# Patient Record
Sex: FEMALE | Race: WHITE | URBAN - METROPOLITAN AREA
[De-identification: names, ages, dates, MRNs, and addresses within clinical notes are randomized per-mention and may not be internally consistent; named-entity substitution may affect disease eponyms.]

---

## 2022-06-21 ENCOUNTER — TRANSFERRED RECORDS (OUTPATIENT)
Dept: HEALTH INFORMATION MANAGEMENT | Facility: CLINIC | Age: 62
End: 2022-06-21

## 2022-07-05 ENCOUNTER — TRANSFERRED RECORDS (OUTPATIENT)
Dept: HEALTH INFORMATION MANAGEMENT | Facility: CLINIC | Age: 62
End: 2022-07-05

## 2022-07-05 ENCOUNTER — MEDICAL CORRESPONDENCE (OUTPATIENT)
Dept: HEALTH INFORMATION MANAGEMENT | Facility: CLINIC | Age: 62
End: 2022-07-05

## 2022-07-11 ENCOUNTER — TRANSCRIBE ORDERS (OUTPATIENT)
Dept: OTHER | Age: 62
End: 2022-07-11

## 2022-07-11 DIAGNOSIS — H00.12 CHALAZION RIGHT LOWER EYELID: Primary | ICD-10-CM

## 2022-08-03 ENCOUNTER — OFFICE VISIT (OUTPATIENT)
Dept: OPHTHALMOLOGY | Facility: CLINIC | Age: 62
End: 2022-08-03
Payer: COMMERCIAL

## 2022-08-03 ENCOUNTER — TELEPHONE (OUTPATIENT)
Dept: OPHTHALMOLOGY | Facility: CLINIC | Age: 62
End: 2022-08-03

## 2022-08-03 DIAGNOSIS — C44.1122 BASAL CELL CARCINOMA (BCC) OF RIGHT LOWER EYELID: Primary | ICD-10-CM

## 2022-08-03 PROCEDURE — 92285 EXTERNAL OCULAR PHOTOGRAPHY: CPT | Mod: GC | Performed by: OPHTHALMOLOGY

## 2022-08-03 PROCEDURE — 99204 OFFICE O/P NEW MOD 45 MIN: CPT | Mod: GC | Performed by: OPHTHALMOLOGY

## 2022-08-03 RX ORDER — CHLORAL HYDRATE 500 MG
1 CAPSULE ORAL DAILY
COMMUNITY

## 2022-08-03 RX ORDER — LEVOTHYROXINE SODIUM 50 UG/1
50 TABLET ORAL
COMMUNITY
Start: 2022-08-03

## 2022-08-03 RX ORDER — SERTRALINE HYDROCHLORIDE 100 MG/1
100 TABLET, FILM COATED ORAL DAILY
COMMUNITY
Start: 2022-01-26

## 2022-08-03 RX ORDER — SIMVASTATIN 40 MG
20 TABLET ORAL DAILY
COMMUNITY
Start: 2022-01-26

## 2022-08-03 ASSESSMENT — VISUAL ACUITY
METHOD: SNELLEN - LINEAR
CORRECTION_TYPE: GLASSES
OS_CC: 20/15
OS_CC+: -1
OD_CC: 20/20

## 2022-08-03 ASSESSMENT — TONOMETRY
OD_IOP_MMHG: 16
OS_IOP_MMHG: 14
IOP_METHOD: ICARE

## 2022-08-03 ASSESSMENT — CONF VISUAL FIELD
METHOD: COUNTING FINGERS
OD_NORMAL: 1
OS_NORMAL: 1

## 2022-08-03 ASSESSMENT — EXTERNAL EXAM - LEFT EYE: OS_EXAM: NORMAL

## 2022-08-03 ASSESSMENT — EXTERNAL EXAM - RIGHT EYE: OD_EXAM: NORMAL

## 2022-08-03 NOTE — LETTER
8/3/2022         RE:  :  MRN: Shante Huggins  1960  [id number]     Dear Dr. Daly,    Thank you for asking me to see your patient, Shante Huggins, for an oculoplastic   consultation.  My assessment and plan are below.  For further details, please see my attached clinic note.      Chief Complaint(s) and History of Present Illness(es)     Lesion On Right Lower Lid     Laterality: right lower lid              Comments     Patient referred by Dr. Daly for lesion, right lower eyelid. Biopsy   done by Dr. Daly on 22, came back positive for basal cell   carcinoma. Pt presents today to discuss next steps. No concerns with how   the area has healed after the biopsy.     Pt has 1 year history of RLL lesion that was progressively enlarging, and she saw her ophthalmologist Dr. Daly who biopsied the lesion and the report shows BCC. She also recently had BCC removed from the left forehead (hairline) region s/p mohs and reconstruction. Pt was referred here for further eval. She denies prior malignancies.      Assessment & Plan     Shante Huggins is a 62 year old female with the following diagnoses:    Diagnosis Comments   1. Basal cell carcinoma (BCC) of right lower eyelid       Right lower eyelid lesion  - Path: basal cell carcinoma extending to radial tissue edge  - Will arrange for mohs followed by reconstruction  - Plan: Right lower eyelid Mohs and reconstruction - likely Josh    Patient disposition:   No follow-ups on file.         Again, thank you for allowing me to participate in the care of your patient.      Sincerely,    Shirley Kimball MD  Department of Ophthalmology and Visual Neurosciences  [facility]    CC: VINNY DALY  [facility]  [address] [address]  Via Fax: [phone]

## 2022-08-03 NOTE — NURSING NOTE
Chief Complaints and History of Present Illnesses   Patient presents with     Lesion On Right Lower Lid       Chief Complaint(s) and History of Present Illness(es)     Lesion On Right Lower Lid     Laterality: right lower lid              Comments     Patient referred by Dr. Daly for lesion, right lower eyelid. Biopsy done by Dr. Daly on 6/21/22, came back positive for basal cell carcinoma. Pt presents today to discuss next steps. No concerns with how the area has healed after the biopsy.                     Khadijah Padron, COA

## 2022-08-03 NOTE — TELEPHONE ENCOUNTER
Message left for the patient to call back to get surgery scheduled with Dr. Watson on either 9-19 or 9-26 following Mohs with Dr. Johns.  Alexus Prince, Surgery Scheduling Coordinator

## 2022-08-03 NOTE — PROGRESS NOTES
Chief Complaint(s) and History of Present Illness(es)     Lesion On Right Lower Lid     Laterality: right lower lid              Comments     Patient referred by Dr. Daly for lesion, right lower eyelid. Biopsy   done by Dr. Daly on 6/21/22, came back positive for basal cell   carcinoma. Pt presents today to discuss next steps. No concerns with how   the area has healed after the biopsy.     Pt has 1 year history of RLL lesion that was progressively enlarging, and she saw her ophthalmologist Dr. Daly who biopsied the lesion and the report shows BCC. She also recently had BCC removed from the left forehead (hairline) region s/p mohs and reconstruction. Pt was referred here for further eval. She denies prior malignancies.      Assessment & Plan     Shante Huggins is a 62 year old female with the following diagnoses:    Diagnosis Comments   1. Basal cell carcinoma (BCC) of right lower eyelid       Right lower eyelid lesion  - Path: basal cell carcinoma extending to radial tissue edge  - Will arrange for mohs followed by reconstruction  - Plan: Right lower eyelid Mohs and reconstruction - likely Tenzel    Patient disposition:   No follow-ups on file.       Lola Asencio MD  Oculoplastic Surgery Fellow    Attending Physician Attestation: Complete documentation of historical and exam elements from today's encounter can be found in the full encounter summary report (not reduplicated in this progress note). I personally obtained the chief complaint(s) and history of present illness. I confirmed and edited as necessary the review of systems, past medical/surgical history, family history, social history, and examination findings as documented by others; and I examined the patient myself. I personally reviewed the relevant tests, images, and reports as documented above. I formulated and edited as necessary the assessment and plan and discussed the findings and management plan with the patient.  -Shirley Kimball,  MD      Today with Shante Huggins, I reviewed the indications, risks, benefits, and alternatives of the proposed surgical procedure including, but not limited to, failure obtain the desired result  and need for additional surgery, bleeding, infection,scarring, need for flaps that require the eyelid to be sewn closed, and the remote possibility of permanent damage to any organ system with the use of anesthesia.  I provided multiple opportunities for the questions, answered all questions to the best of my ability, and confirmed that my answers and my discussion were understood.   Shirley Kimball MD

## 2022-08-08 PROBLEM — C44.1122 BASAL CELL CARCINOMA (BCC) OF RIGHT LOWER EYELID: Status: ACTIVE | Noted: 2022-08-08

## 2022-08-08 NOTE — TELEPHONE ENCOUNTER
Date Scheduled: 9-19-22  Facility: [facility]  Surgeon: Dr. Kimball   Post-op appointment scheduled:    scheduled?: No  Surgery packet/instructions confirmed received?  Yes-mailed  Special Considerations:   Alexus Prince, Surgery Scheduling Coordinator

## 2022-08-09 ENCOUNTER — TELEPHONE (OUTPATIENT)
Dept: DERMATOLOGY | Facility: CLINIC | Age: 62
End: 2022-08-09

## 2022-08-09 NOTE — LETTER
"      Ms.Jodi Huggins  [address] [place] [address]        You have an upcoming appointment with Dr. Johns for Mohs surgery.  It is scheduled for 09/19/2022 at 7:30 AM.  Please check-in 15 minutes prior to your appointment at check-in desk \"D\" on the 2nd floor. Our address is [address] [place] [address].  Please review these important reminders:    1) Expect to be here through the lunch hour.  The Mohs surgical procedure can be an extensive surgery requiring multiple stages.  Each stage may take 1-2 hours.     2) You will need a  to be with you if your surgical site is close to your eyes. You can get swelling/bruising immediately after surgery and will go home with a big bulky bandage that could obstruct your view of driving safely.     3) Wear comfortable clothing -- preferably a button down shirt or a loose fitted shirt (to avoid pulling over pressure bandage off when you get home). If your surgery site is on your leg, try wearing loose fitted pants. If your surgery site is on your arm, try wearing a short-sleeve shirt.     4) Bring reading material or other items to help pass time. We do have internet access available if you own a laptop, iPad, etc.    7) If your surgery is on the face, please do not wear make-up.  8) You will not be able to get the wound wet for 48 hrs.  Please shower the morning of your procedure.  If the procedure is on your scalp or along your hair line, please wash your hair the morning of the procedure and avoid any hair products.    9)If you need to reschedule or have any questions or concerns, please call me at [phone].    Sincerely,  Diane Mcdowell RN  General surgery, Plastics and Dermatology   [facility]            "

## 2022-08-09 NOTE — TELEPHONE ENCOUNTER
[facility] [place] Pre-Surgery Screening Note     Pre-screening Information:  Hx of Skin Cancer: Yes  Hx of Mohs Surgery: Yes  Transplant: No  Immunocompromised: No  Current Anticoagulant(s): None  Bleeding Disorder(s): No  Stent: No  Pacemaker: No  Defibrillator: No  Brain/Nerve Stimulator: No  Endocarditis/Rheumatic Fever Hx: No  Vascular graft: No  Prophylactic Antibiotic Needed: No  Congenital heart defect: No  Prosthetic Heart Valve: No  Lesion on Leg/Groin: No  Prosthetic Joint : No  Diabetic: No  HIV/AIDS: No  Hepatitis: No  Pregnant: No  Prior Problem with Local Anesthesia: No  Patient wears CPAP mask: No  Currently Hypertensive: No  Photoprotection: for extended outdoor activities only  Sunburns: frequently  Tanning Bed Use: never  Current Tobacco Use: No  Current Alcohol Use: Yes (once per month)  Extended Care Facility: No  Occupation:  (worked with [employer])  Referring MD:  in [facility]   needed?: Yes  Do you have mobility issues?: No  Do you use any assistive devices/DME?: No  Do you have any issues with lying for long periods of time?: No      Medications/Allergies  Medications and allergies review with patient: Yes     Current Outpatient Medications   Medication Sig Dispense Refill     betamethasone valerate (VALISONE) 0.1 % external ointment Apply 1 Application topically as needed       fish oil-omega-3 fatty acids 1000 MG capsule Take 1 capsule by mouth daily       levothyroxine (SYNTHROID/LEVOTHROID) 50 MCG tablet Take 50 mcg by mouth daily (with breakfast)       sertraline (ZOLOFT) 100 MG tablet Take 100 mg by mouth daily       simvastatin (ZOCOR) 40 MG tablet Take 20 mg by mouth daily 0.5 tablet daily       Allergies   Allergen Reactions     Animal Dander Unknown     Dogs, cats, dust, etc.       Pertinent Labs:  Last INR: No results found for: INR    Other Reminders:    Reminded patient to take any order prophylactic  antibiotics 1 hour prior to appointment: N/A     Please be aware that this can be an all day procedure. Please bring your daily medications and food/cash. Encourage patient to eat prior to procedure(s). After care instructions were reviewed with patient.    If any positives, send to RN to initiate antibiotic prophylaxis protocol and/or anticoagulation management protocol      Diane Mcdowell RN

## 2022-08-09 NOTE — TELEPHONE ENCOUNTER
Left message for patient to call [facility] back at [phone]      Patient is scheduled for MOHS with Dr. Johns on 9/19/2022 at 7:30am and closure with Dr. Watson same day in OR. Please schedule for telephone consult with Dr. Johns prior to MOHS appointment and complete flow sheet for MOHS.     Arlette Ji LPN

## 2022-08-29 ENCOUNTER — VIRTUAL VISIT (OUTPATIENT)
Dept: DERMATOLOGY | Facility: CLINIC | Age: 62
End: 2022-08-29
Payer: COMMERCIAL

## 2022-08-29 DIAGNOSIS — C44.1122 BASAL CELL CARCINOMA OF RIGHT LOWER EYELID: Primary | ICD-10-CM

## 2022-08-29 PROCEDURE — 99213 OFFICE O/P EST LOW 20 MIN: CPT | Mod: TEL | Performed by: DERMATOLOGY

## 2022-08-29 NOTE — PROGRESS NOTES
[facility] Dermatology Note  Encounter Date: Aug 29, 2022  Store-and-Forward and Telephone ([phone]). Location of teledermatologist: [facility].  Start time: 1645. End time: 1650.      Dermatology Problem List:  1. BCC - right lower eyelid s/p bx 8/29/22; Mohs and Oculoplastic repair scheduled 9/19/22    ____________________________________________    Assessment & Plan:    # BCC - right lower eyelid s/p bx 8/29/22    Reviewed pathology report and nature of diagnosis.     Risks, benefits, and process of Mohs micrographic surgery were discussed including possibility of damage to surrounding anatomical structures and infection Mohs scheduled 9/19/22    No indication for pre-op antibiotics.    Pre-op written instructions provided.     Procedures Performed:   None    Follow-up: Sept 19 for Mohs.     Staff and Scribe:     Scribe Disclosure:   IRaymond, am serving as a scribe to document services personally performed by this physician, Dr. Dion Johns, based on data collection and the provider's statements to me.     Provider Disclosure:   The documentation recorded by the scribe accurately reflects the services I personally performed and the decisions made by me.    Dion Johns DO    Department of Dermatology  [facility]: Phone: [phone], Fax:[phone]  [facility]: Phone: [phone], Fax: [phone]    ____________________________________________    CC: Consult For (Mohs consult - BCC right lower eyelid.  Dr. Watson to complete repair.)    HPI:  Ms. Shante Huggins is a(n) 62 year old female who presents today as a return patient for a consult.     Last seen on 5/4/22 with Dr. Ang for [facility]. She has had BCC and Mohs on her forehead before.     Patient is otherwise feeling well, without additional skin concerns.    Labs Reviewed:    Dermpath report reviewed. BCC - right lower eyelid s/p bx 8/29/22    Physical Exam:  Vitals: There were no vitals taken for this visit.  SKIN: Teledermatology photos were reviewed; image quality and interpretability: acceptable.   - R lower lid lateral to pupil ~4mm sclerotic white macule on mucosal conjunctiva.   - No other lesions of concern on areas examined.     Medications:  Current Outpatient Medications   Medication     betamethasone valerate (VALISONE) 0.1 % external ointment     fish oil-omega-3 fatty acids 1000 MG capsule     levothyroxine (SYNTHROID/LEVOTHROID) 50 MCG tablet     sertraline (ZOLOFT) 100 MG tablet     simvastatin (ZOCOR) 40 MG tablet     No current facility-administered medications for this visit.      Past Medical History:   Patient Active Problem List   Diagnosis     Basal cell carcinoma (BCC) of right lower eyelid     No past medical history on file.     CC No referring provider defined for this encounter. on close of this encounter.

## 2022-08-29 NOTE — LETTER
8/29/2022         RE: Shante Huggins  [address] [place] [address]        Dear Colleague,    Thank you for referring your patient, Shante Huggins, to the [facility]. Please see a copy of my visit note below.    [facility] Dermatology Note  Encounter Date: Aug 29, 2022  Store-and-Forward and Telephone ([phone]). Location of teledermatologist: [facility].  Start time: 1645. End time: 1650.      Dermatology Problem List:  1. BCC - right lower eyelid s/p bx 8/29/22; Mohs and Oculoplastic repair scheduled 9/19/22    ____________________________________________    Assessment & Plan:    # BCC - right lower eyelid s/p bx 8/29/22    Reviewed pathology report and nature of diagnosis.     Risks, benefits, and process of Mohs micrographic surgery were discussed including possibility of damage to surrounding anatomical structures and infection Mohs scheduled 9/19/22    No indication for pre-op antibiotics.    Pre-op written instructions provided.     Procedures Performed:   None    Follow-up: Sept 19 for Mohs.     Staff and Scribe:     Scribe Disclosure:   I, Raymond Trinh, am serving as a scribe to document services personally performed by this physician, Dr. iDon Johns, based on data collection and the provider's statements to me.     Provider Disclosure:   The documentation recorded by the scribe accurately reflects the services I personally performed and the decisions made by me.    Dion Johns DO    Department of Dermatology  [facility]: Phone: [phone], Fax:[phone]  [facility]: Phone: [phone], Fax: [phone]    ____________________________________________    CC: Consult For (Mohs consult - BCC right lower eyelid.  Dr. Watson to complete repair.)    HPI:  Ms. Shante Huggins is a(n) 62 year old female who  presents today as a return patient for a consult.     Last seen on 5/4/22 with Dr. Ang for [facility]. She has had BCC and Mohs on her forehead before.     Patient is otherwise feeling well, without additional skin concerns.    Labs Reviewed:   Dermpath report reviewed. BCC - right lower eyelid s/p bx 8/29/22    Physical Exam:  Vitals: There were no vitals taken for this visit.  SKIN: Teledermatology photos were reviewed; image quality and interpretability: acceptable.   - R lower lid lateral to pupil ~4mm sclerotic white macule on mucosal conjunctiva.   - No other lesions of concern on areas examined.     Medications:  Current Outpatient Medications   Medication     betamethasone valerate (VALISONE) 0.1 % external ointment     fish oil-omega-3 fatty acids 1000 MG capsule     levothyroxine (SYNTHROID/LEVOTHROID) 50 MCG tablet     sertraline (ZOLOFT) 100 MG tablet     simvastatin (ZOCOR) 40 MG tablet     No current facility-administered medications for this visit.      Past Medical History:   Patient Active Problem List   Diagnosis     Basal cell carcinoma (BCC) of right lower eyelid     No past medical history on file.     CC No referring provider defined for this encounter. on close of this encounter.          Again, thank you for allowing me to participate in the care of your patient.        Sincerely,        Dion Johns MD

## 2022-08-29 NOTE — NURSING NOTE
Teledermatology Nurse Call Patients:   Chief Complaint   Patient presents with     Consult For     Mohs consult - BCC right lower eyelid.  Dr. Watson to complete repair.     Are you in the [facility] at the time of the encounter? yes    Today's visit will be billed to you and your insurance.    A teledermatology visit is not as thorough as an in-person visit and the quality of the photograph sent may not be of the same quality as that taken by the dermatology clinic.    Lluvia Cramer RN

## 2022-09-16 ENCOUNTER — ANESTHESIA EVENT (OUTPATIENT)
Dept: SURGERY | Facility: AMBULATORY SURGERY CENTER | Age: 62
End: 2022-09-16
Payer: COMMERCIAL

## 2022-09-16 NOTE — ANESTHESIA PREPROCEDURE EVALUATION
Anesthesia Pre-Procedure Evaluation    Patient: Shante Huggins   MRN: [id number] : 1960        Procedure : Procedure(s):  Right lower eyelid Mohs closure          No past medical history on file.   No past surgical history on file.   Allergies   Allergen Reactions     Animal Dander Unknown     Dogs, cats, dust, etc.      Social History     Tobacco Use     Smoking status: Never Smoker     Smokeless tobacco: Never Used   Substance Use Topics     Alcohol use: Not on file      Wt Readings from Last 1 Encounters:   22 70.1 kg (154 lb 8.7 oz)           Physical Exam    Airway        Mallampati: II   TM distance: > 3 FB   Neck ROM: full   Mouth opening: > 3 cm    Respiratory Devices and Support         Dental  no notable dental history         Cardiovascular   cardiovascular exam normal          Pulmonary   pulmonary exam normal                OUTSIDE LABS:  CBC: No results found for: WBC, HGB, HCT, PLT  BMP: No results found for: NA, POTASSIUM, CHLORIDE, CO2, BUN, CR, GLC  COAGS: No results found for: PTT, INR, FIBR  POC: No results found for: BGM, HCG, HCGS  HEPATIC: No results found for: ALBUMIN, PROTTOTAL, ALT, AST, GGT, ALKPHOS, BILITOTAL, BILIDIRECT, JEFERSON  OTHER: No results found for: PH, LACT, A1C, BERNADETTE, PHOS, MAG, LIPASE, AMYLASE, TSH, T4, T3, CRP, SED    Anesthesia Plan    ASA Status:  2   NPO Status:  NPO Appropriate    Anesthesia Type: MAC.     - Reason for MAC: straight local not clinically adequate   Induction: Intravenous, Propofol.   Maintenance: TIVA.        Consents    Anesthesia Plan(s) and associated risks, benefits, and realistic alternatives discussed. Questions answered and patient/representative(s) expressed understanding.    - Discussed:     - Discussed with:  Patient      - Extended Intubation/Ventilatory Support Discussed: No.      - Patient is DNR/DNI Status: No    Use of blood products discussed: No .     Postoperative Care    Pain management: IV analgesics, Oral pain medications,  Multi-modal analgesia.   PONV prophylaxis: Dexamethasone or Solumedrol, Ondansetron (or other 5HT-3), Background Propofol Infusion     Comments:                Matthias Monsivais MD

## 2022-09-19 ENCOUNTER — HOSPITAL ENCOUNTER (OUTPATIENT)
Facility: AMBULATORY SURGERY CENTER | Age: 62
Discharge: HOME | End: 2022-09-19
Attending: OPHTHALMOLOGY | Admitting: OPHTHALMOLOGY
Payer: COMMERCIAL

## 2022-09-19 ENCOUNTER — ANESTHESIA (OUTPATIENT)
Dept: SURGERY | Facility: AMBULATORY SURGERY CENTER | Age: 62
End: 2022-09-19
Payer: COMMERCIAL

## 2022-09-19 ENCOUNTER — OFFICE VISIT (OUTPATIENT)
Dept: DERMATOLOGY | Facility: CLINIC | Age: 62
End: 2022-09-19
Payer: COMMERCIAL

## 2022-09-19 VITALS
RESPIRATION RATE: 16 BRPM | OXYGEN SATURATION: 98 % | WEIGHT: 154.54 LBS | SYSTOLIC BLOOD PRESSURE: 121 MMHG | TEMPERATURE: 97.8 F | DIASTOLIC BLOOD PRESSURE: 63 MMHG

## 2022-09-19 VITALS — SYSTOLIC BLOOD PRESSURE: 117 MMHG | DIASTOLIC BLOOD PRESSURE: 75 MMHG

## 2022-09-19 DIAGNOSIS — C44.1122 BASAL CELL CARCINOMA (BCC) OF RIGHT LOWER EYELID: ICD-10-CM

## 2022-09-19 DIAGNOSIS — C44.1122 BASAL CELL CARCINOMA (BCC) OF RIGHT LOWER EYELID: Primary | ICD-10-CM

## 2022-09-19 PROCEDURE — 67935 REPAIR EYELID WOUND: CPT | Mod: E4 | Performed by: OPHTHALMOLOGY

## 2022-09-19 PROCEDURE — 17311 MOHS 1 STAGE H/N/HF/G: CPT | Mod: GC | Performed by: DERMATOLOGY

## 2022-09-19 PROCEDURE — 14060 TIS TRNFR E/N/E/L 10 SQ CM/<: CPT | Performed by: OPHTHALMOLOGY

## 2022-09-19 PROCEDURE — G8916 PT W IV AB GIVEN ON TIME: HCPCS

## 2022-09-19 PROCEDURE — 17312 MOHS ADDL STAGE: CPT | Mod: GC | Performed by: DERMATOLOGY

## 2022-09-19 PROCEDURE — 21282 LATERAL CANTHOPEXY: CPT | Mod: RT | Performed by: OPHTHALMOLOGY

## 2022-09-19 PROCEDURE — 67971 RECONSTRUCTION OF EYELID: CPT | Mod: E4

## 2022-09-19 PROCEDURE — G8907 PT DOC NO EVENTS ON DISCHARG: HCPCS

## 2022-09-19 RX ORDER — PROPARACAINE HYDROCHLORIDE 5 MG/ML
1 SOLUTION/ DROPS OPHTHALMIC ONCE
Status: COMPLETED | OUTPATIENT
Start: 2022-09-19 | End: 2022-09-19

## 2022-09-19 RX ORDER — FENTANYL CITRATE 50 UG/ML
25 INJECTION, SOLUTION INTRAMUSCULAR; INTRAVENOUS
Status: DISCONTINUED | OUTPATIENT
Start: 2022-09-19 | End: 2022-09-20 | Stop reason: HOSPADM

## 2022-09-19 RX ORDER — MEPERIDINE HYDROCHLORIDE 25 MG/ML
12.5 INJECTION INTRAMUSCULAR; INTRAVENOUS; SUBCUTANEOUS
Status: DISCONTINUED | OUTPATIENT
Start: 2022-09-19 | End: 2022-09-20 | Stop reason: HOSPADM

## 2022-09-19 RX ORDER — LIDOCAINE 40 MG/G
CREAM TOPICAL
Status: DISCONTINUED | OUTPATIENT
Start: 2022-09-19 | End: 2022-09-20 | Stop reason: HOSPADM

## 2022-09-19 RX ORDER — ERYTHROMYCIN 5 MG/G
OINTMENT OPHTHALMIC ONCE
Status: COMPLETED | OUTPATIENT
Start: 2022-09-19 | End: 2022-09-19

## 2022-09-19 RX ORDER — FENTANYL CITRATE 50 UG/ML
25 INJECTION, SOLUTION INTRAMUSCULAR; INTRAVENOUS EVERY 5 MIN PRN
Status: DISCONTINUED | OUTPATIENT
Start: 2022-09-19 | End: 2022-09-20 | Stop reason: HOSPADM

## 2022-09-19 RX ORDER — SODIUM CHLORIDE, SODIUM LACTATE, POTASSIUM CHLORIDE, CALCIUM CHLORIDE 600; 310; 30; 20 MG/100ML; MG/100ML; MG/100ML; MG/100ML
INJECTION, SOLUTION INTRAVENOUS CONTINUOUS
Status: DISCONTINUED | OUTPATIENT
Start: 2022-09-19 | End: 2022-09-20 | Stop reason: HOSPADM

## 2022-09-19 RX ORDER — OXYCODONE HYDROCHLORIDE 5 MG/1
5 TABLET ORAL EVERY 4 HOURS PRN
Status: DISCONTINUED | OUTPATIENT
Start: 2022-09-19 | End: 2022-09-20 | Stop reason: HOSPADM

## 2022-09-19 RX ORDER — LORAZEPAM 2 MG/ML
.5-1 INJECTION INTRAMUSCULAR
Status: DISCONTINUED | OUTPATIENT
Start: 2022-09-19 | End: 2022-09-20 | Stop reason: HOSPADM

## 2022-09-19 RX ORDER — PROPOFOL 10 MG/ML
INJECTION, EMULSION INTRAVENOUS CONTINUOUS PRN
Status: DISCONTINUED | OUTPATIENT
Start: 2022-09-19 | End: 2022-09-19

## 2022-09-19 RX ORDER — TETRACAINE HYDROCHLORIDE 5 MG/ML
SOLUTION OPHTHALMIC PRN
Status: DISCONTINUED | OUTPATIENT
Start: 2022-09-19 | End: 2022-09-19 | Stop reason: HOSPADM

## 2022-09-19 RX ORDER — LIDOCAINE HYDROCHLORIDE 20 MG/ML
INJECTION, SOLUTION INFILTRATION; PERINEURAL PRN
Status: DISCONTINUED | OUTPATIENT
Start: 2022-09-19 | End: 2022-09-19

## 2022-09-19 RX ORDER — FENTANYL CITRATE 50 UG/ML
INJECTION, SOLUTION INTRAMUSCULAR; INTRAVENOUS PRN
Status: DISCONTINUED | OUTPATIENT
Start: 2022-09-19 | End: 2022-09-19

## 2022-09-19 RX ORDER — ONDANSETRON 2 MG/ML
4 INJECTION INTRAMUSCULAR; INTRAVENOUS EVERY 30 MIN PRN
Status: DISCONTINUED | OUTPATIENT
Start: 2022-09-19 | End: 2022-09-20 | Stop reason: HOSPADM

## 2022-09-19 RX ORDER — ONDANSETRON 4 MG/1
4 TABLET, ORALLY DISINTEGRATING ORAL EVERY 30 MIN PRN
Status: DISCONTINUED | OUTPATIENT
Start: 2022-09-19 | End: 2022-09-20 | Stop reason: HOSPADM

## 2022-09-19 RX ORDER — HYDRALAZINE HYDROCHLORIDE 20 MG/ML
2.5-5 INJECTION INTRAMUSCULAR; INTRAVENOUS EVERY 10 MIN PRN
Status: DISCONTINUED | OUTPATIENT
Start: 2022-09-19 | End: 2022-09-20 | Stop reason: HOSPADM

## 2022-09-19 RX ORDER — PROPOFOL 10 MG/ML
INJECTION, EMULSION INTRAVENOUS PRN
Status: DISCONTINUED | OUTPATIENT
Start: 2022-09-19 | End: 2022-09-19

## 2022-09-19 RX ORDER — KETOROLAC TROMETHAMINE 30 MG/ML
15 INJECTION, SOLUTION INTRAMUSCULAR; INTRAVENOUS EVERY 6 HOURS PRN
Status: DISCONTINUED | OUTPATIENT
Start: 2022-09-19 | End: 2022-09-20 | Stop reason: HOSPADM

## 2022-09-19 RX ORDER — ACETAMINOPHEN 325 MG/1
975 TABLET ORAL ONCE
Status: COMPLETED | OUTPATIENT
Start: 2022-09-19 | End: 2022-09-19

## 2022-09-19 RX ORDER — NEOMYCIN SULFATE, POLYMYXIN B SULFATE AND DEXAMETHASONE 3.5; 10000; 1 MG/ML; [USP'U]/ML; MG/ML
SUSPENSION/ DROPS OPHTHALMIC
Qty: 5 ML | Refills: 0 | Status: SHIPPED | OUTPATIENT
Start: 2022-09-19 | End: 2022-10-05

## 2022-09-19 RX ORDER — ERYTHROMYCIN 5 MG/G
OINTMENT OPHTHALMIC
Qty: 3.5 G | Refills: 0 | Status: SHIPPED | OUTPATIENT
Start: 2022-09-19

## 2022-09-19 RX ORDER — ERYTHROMYCIN 5 MG/G
OINTMENT OPHTHALMIC PRN
Status: DISCONTINUED | OUTPATIENT
Start: 2022-09-19 | End: 2022-09-19 | Stop reason: HOSPADM

## 2022-09-19 RX ORDER — ALBUTEROL SULFATE 0.83 MG/ML
2.5 SOLUTION RESPIRATORY (INHALATION) EVERY 4 HOURS PRN
Status: DISCONTINUED | OUTPATIENT
Start: 2022-09-19 | End: 2022-09-20 | Stop reason: HOSPADM

## 2022-09-19 RX ADMIN — PROPOFOL 50 MCG/KG/MIN: 10 INJECTION, EMULSION INTRAVENOUS at 11:56

## 2022-09-19 RX ADMIN — ERYTHROMYCIN: 5 OINTMENT OPHTHALMIC at 16:24

## 2022-09-19 RX ADMIN — FENTANYL CITRATE 25 MCG: 50 INJECTION, SOLUTION INTRAMUSCULAR; INTRAVENOUS at 11:46

## 2022-09-19 RX ADMIN — SODIUM CHLORIDE, SODIUM LACTATE, POTASSIUM CHLORIDE, CALCIUM CHLORIDE: 600; 310; 30; 20 INJECTION, SOLUTION INTRAVENOUS at 10:29

## 2022-09-19 RX ADMIN — ACETAMINOPHEN 975 MG: 325 TABLET ORAL at 10:24

## 2022-09-19 RX ADMIN — PROPARACAINE HYDROCHLORIDE 1 DROP: 5 SOLUTION/ DROPS OPHTHALMIC at 16:24

## 2022-09-19 RX ADMIN — PROPOFOL 100 MG: 10 INJECTION, EMULSION INTRAVENOUS at 11:46

## 2022-09-19 NOTE — ANESTHESIA CARE TRANSFER NOTE
Patient: Shante Huggins    Procedure: Procedure(s):  Right lower eyelid Mohs closure       Diagnosis: Basal cell carcinoma (BCC) of right lower eyelid [C44.1122]  Diagnosis Additional Information: No value filed.    Anesthesia Type:   MAC     Note:    Oropharynx: oropharynx clear of all foreign objects  Level of Consciousness: awake  Oxygen Supplementation: room air    Independent Airway: airway patency satisfactory and stable  Dentition: dentition unchanged  Vital Signs Stable: post-procedure vital signs reviewed and stable  Report to RN Given: handoff report given  Patient transferred to: Phase II  Comments: To Phase II. Report to RN.  VSS Resp status stable.  Handoff Report: Identifed the Patient, Identified the Reponsible Provider, Reviewed the pertinent medical history, Discussed the surgical course, Reviewed Intra-OP anesthesia mangement and issues during anesthesia, Set expectations for post-procedure period and Allowed opportunity for questions and acknowledgement of understanding      Vitals:  Vitals Value Taken Time   BP     Temp     Pulse     Resp     SpO2         Electronically Signed By: JAVY Crabtree CRNA  September 19, 2022  12:18 PM

## 2022-09-19 NOTE — DISCHARGE INSTRUCTIONS
[facility]   Adult Discharge Orders & Instructions     For 24 hours after surgery    Get plenty of rest.  A responsible adult must stay with you for at least 24 hours after you leave the hospital.   Do not drive or use heavy equipment.  If you have weakness or tingling, don't drive or use heavy equipment until this feeling goes away.  Do not drink alcohol.  Avoid strenuous or risky activities.  Ask for help when climbing stairs.   You may feel lightheaded.  IF so, sit for a few minutes before standing.  Have someone help you get up.   If you have nausea (feel sick to your stomach): Drink only clear liquids such as apple juice, ginger ale, broth or 7-Up.  Rest may also help.  Be sure to drink enough fluids.  Move to a regular diet as you feel able.  You may have a slight fever. Call the doctor if your fever is over 100 F (37.7 C) (taken under the tongue) or lasts longer than 24 hours.  You may have a dry mouth, a sore throat, muscle aches or trouble sleeping.  These should go away after 24 hours.  Do not make important or legal decisions.     Call your doctor for any of the followin.  Signs of infection (fever, growing tenderness at the surgery site, a large amount of drainage or bleeding, severe pain, foul-smelling drainage, redness, swelling).    2. It has been over 8 to 10 hours since surgery and you are still not able to urinate (pass water).    3.  Headache for over 24 hours.    4.  Numbness, tingling or weakness the day after surgery (if you had spinal anesthesia).                  5. Signs of Covid-19 infection (temperature over 100 degrees, shortness of breath, cough, loss of taste/smell, generalized body aches, persistent headache,                  chills, sore throat, nausea/vomiting/diarrhea).    ________________________________________    Post-operative Instructions  Ophthalmic Plastic and Reconstructive Surgery    Shirley Kimball M.D.     All instructions apply to the operated  eye(s) or eyelid(s).    Wound care and personal care  ? Apply ice compresses 15 minutes of every hour while awake for 2 days. If you are sleeping, you don't need to wake up to ice. As long as there is no further bleeding, after two days, switch to warm water compresses for five minutes, four times a day until seen by your physician.   ? You may shower or wash your hair the day after surgery. Do not go swimming for at least 2 weeks to prevent contamination of your wounds.  ? You may go for walks and light activity is ok, but no heavy (over 15 pounds) lifting, bending or excessive straining for one week.   ? Do not apply make-up to the eyes or eyelids for 2 weeks after surgery.  ? Expect some swelling, bruising, black eye (even into the lower eyelids and cheeks). Also expect serum caking, crusting and discharge from the eye and/or incisions. A small amount of surface bleeding, and depending on the type of surgery, bleeding from the inside of the eyelid, is normal for the first 48 hours.  ? Avoid straining, bending at the waist, or lifting more than 15 pounds for 1 week. Sleeping with your head elevated, such as in a recliner, for the first several days can help swelling resolve more quickly.   ? Do continue to ambulate (walk) as you normally would - being sedentary after surgery can cause blood clots.   ? Your eye(s) and eyelid(s) may be painful and tender. This is normal after surgery.      Contact information and follow-up  ? Return to the Eye Clinic for a follow-up appointment with your physician as scheduled. If no appointment has been scheduled:     -  [facility] eye clinic: [phone] for an appointment with Dr. Kimball within 2 to 3 weeks from your date of surgery.     ? For severe pain, bleeding, or loss of vision, call the [facility] at [phone] or [facility] at [phone].     After hours or on weekends and holidays, call [phone] and ask to  speak with the ophthalmologist on call.    An on call person can be reached after hours for concerns. The on call doctor should not call in medication refill requests after hours or on weekends, so please plan accordingly. An effort has been made to provide adequate pain medications following every surgery, and refills will not be provided in most instances.     Medications  ? Restart all regular home medications and eye drops. If you take Plavix or Aspirin on a regular basis, wait for 72 hours after your surgery before restarting these in order to decrease the risk of bleeding complications.  ? Avoid aspirin and aspirin-like medications (Motrin, Aleve, Ibuprofen, Ana-McRae etc) for 72 hours to reduce the risk of bleeding. You may take Tylenol (acetaminophen) for pain.  ? In addition to your home medications, take the following post-operative medications as prescribed by your physician.    ? Apply antibiotic ointment to all sutures three times a day, and into the operated eye(s) at night.  ? Instill prescribed eye drops 3 times a day for 10 days.     You had 975 mg of Tylenol at 1025. You may repeat this after 4:25. Maximum amount of Tylenol/Acetaminophen in a 24 hour period is 4,000 mg.

## 2022-09-19 NOTE — NURSING NOTE
The following medication was given:     MEDICATION:  Lidocaine with epinephrine 1% [id number]  ROUTE: SQ  SITE: see procedure note  DOSE: 2.5mL  LOT #: -EV  : Hospira  EXPIRATION DATE: 1/1/23  NDC#: 2984-6295-52  Was there drug waste? 0.5mL  Multi-dose vial: Yes    Pressure bandage applied to Mohs site on right eye.  [facility] for [facility] with Dr. Kimball.   Patient will follow up with her primary dermatologist for a skin exam in 4-6 months.    Lluvia Cramer RN

## 2022-09-19 NOTE — NURSING NOTE
Shante Huggins's goals for this visit include:   Chief Complaint   Patient presents with     Procedure     MOHS right lower eyelid       She requests these members of her care team be copied on today's visit information:     PCP: Maddie Jamil    Referring Provider:  No referring provider defined for this encounter.    /75     Do you need any medication refills at today's visit? no      Arlette Ji LPN

## 2022-09-19 NOTE — OP NOTE
PREOPERATIVE DIAGNOSIS:  Right lower eyelid defect following Mohs resection of a basal cell carcinoma.      POSTOPERATIVE DIAGNOSIS:  Right lower eyelid defect following Mohs resection of a basal cell carcinoma.      PROCEDURE PERFORMED:  Reconstruction of right lower eyelid with Tenzel rotational flap, reconstruction of eyelid margin with full-thickness eyelid margin defect and lateral canthopexy.      SURGEON:  Shirley Kimball MD       ANESTHESIA:  Monitored with local infiltration of a 50/50 mixture of 2% lidocaine with epinephrine and 0.5% Marcaine.      COMPLICATIONS:  None.      ESTIMATED BLOOD LOSS:  Less than 5 mL.      HISTORY:  Shante Huggins  presented with right lower eyelid defect following Mohs surgery earlier today. It involved just over half of the lower eyelid margin laterally.  After the risks, benefits and alternatives to the proposed procedure were explained, informed consent was obtained.      DESCRIPTION OF PROCEDURE:  Shante Huggins was brought to the operating room and placed supine on the operating table.  IV sedation was given.  The right  lower lid and lateral canthal area were infiltrated with local anesthetic.  The area was prepped and draped in the typical sterile ophthalmic fashion. Lateral canthal rotational flap was drawn and incised with a 15-blade.  Dissection was carried down to the orbicularis with high temperature cautery.  Lateral canthotomy and inferior cantholysis was performed.  The skin in the lower lid defect was widely undermined. The midface deep tissue was imbricated with a 5-0 Monocryl, and this was secured to the lateral orbital rim to lift the soft tissue of the midface. The lower lid was rotated medially and a full-thickness reconstruction performed.  A 6-0 Vicryl suture was used to reconstruct the margin in a vertical mattress internally tied fashion.  Posterior lamella was closed with interrupted partial thickness 5-0 Vicryl sutures.  The skin was secured with  interrupted 6-0 Vicryl and 6-0 plain gut sutures.  Lateral canthopexy of 5-0 Vicryl was used to secure the lateral portion of the flap to the lateral canthal angle.  Lateral canthal angle was then closed with a 5-0 Vicryl suture.  The lateral canthal tissue was closed with interrupted 5-0 Vicryl sutures deep and running 6-0 plain gut suture to close the skin.  Erythromycin ophthalmic ointment was applied.  The patient tolerated the procedure well.  He left the operating room in stable condition.         Shirley Kimball MD

## 2022-09-19 NOTE — ANESTHESIA POSTPROCEDURE EVALUATION
Patient: Shante Huggins    Procedure: Procedure(s):  Right lower eyelid Mohs closure       Anesthesia Type:  MAC    Note:  Disposition: Outpatient   Postop Pain Control: Uneventful            Sign Out: Well controlled pain   PONV:    Neuro/Psych: Uneventful            Sign Out: Acceptable/Baseline neuro status   Airway/Respiratory: Uneventful            Sign Out: Acceptable/Baseline resp. status   CV/Hemodynamics: Uneventful            Sign Out: Acceptable CV status; No obvious hypovolemia; No obvious fluid overload   Other NRE:    DID A NON-ROUTINE EVENT OCCUR?            Last vitals:  Vitals Value Taken Time   /63 09/19/22 1230   Temp 97.8  F (36.6  C) 09/19/22 1217   Pulse     Resp 16 09/19/22 1230   SpO2 98 % 09/19/22 1230       Electronically Signed By: Matthias Monsivais MD  September 19, 2022  1:18 PM

## 2022-09-19 NOTE — PROGRESS NOTES
[facility] [place] Procedure Note     Dermatology Problem List:  1. BCC - R lower eyelid s/p bx Mohs and Oculoplastic repair 9/19/22   ____________________________________________      Date of Service:  Sep 19, 2022  Surgery: Mohs micrographic surgery    Case 1  Repair Type: other (comment) (Oculoplastics)  Repair Size: n/a  Suture Material: n/a  Tumor Type: BCC - Basal cell carcinoma  Location: right lower eyelid  Derm-Path Accession #: [id number]  PreOp Size: 0.9X0.3 cm  PostOp Size: 1.8x1.1 cm  Mohs Accession #: [id number]  Level of Defect: conjunctiva      Procedure:  We discussed the principles of treatment and most likely complications including scarring, bleeding, infection, swelling, pain, crusting, nerve damage, large wound,  incomplete excision, wound dehiscence,  nerve damage, recurrence, and a second procedure may be recommended to obtain the best cosmetic or functional result.    Informed consent was obtained and the patient underwent the procedure as follows:  The patient was placed supine on the operating table.  The cancer was identified, outlined with a marker, and verified by the patient.  The entire surgical field was prepped with Providone.  The surgical site was anesthetized using Lidocaine 1% with epi 1:100,000.      The area of clinically apparent tumor was debulked. The layer of tissue was then surgically excised using a #15 blade and was then transferred onto a specimen sheet maintaining the orientation of the specimen. Hemostasis was obtained using bipolar electrocoagulation. The wound site was then covered with a dressing while the tissue samples were processed for examination.    The excised tissue was transported to the Mohs histology laboratory maintaining the tissue orientation.  The tissue specimen was relaxed so that the entire surgical margin was in a a single horizontal plane for sectioning and inked for precise mapping.  A precise reference map  was drawn to reflect the sectioning of the specimen, colored inking of the margins, and orientation on the patient.  The tissue was processed using horizontal sectioning of the base and continuous peripheral margins.  The histopathologic sections were reviewed in conjunction with the reference map.    Total blocks: 1    Total slides:  3    Residual tumor was identified and indicated in red on the reference map, identifying the location where further tissue excision was necessary. Therefore, an additional stage of Mohs Micrographic surgery was deemed necessary.     Stage II   The patient was returned to the operating room, and the area prepped in the usual manner. The residual tumor was excised using the reference map as a guide. The specimen was transfered to a labeled specimen sheet maintaining the orientation of the specimen. Hemostasis was obtained and the wound site was covered with a dressing while the tissue was processed for examination.     The excised tissue was transported to the Mohs histology laboratory maintaining orientation. The specimen margins were inked for precise mapping and a reference map was prepared for the is additional stage to maintain precise orientation as described above. The tissue was processed using horizontal sectioning of the base and continuous peripheral margins. The histopathologic sections were reviewed in conjunction with the reference map.     Total blocks: 1    Total slides:  1    There were no cancer cells visualized on examination, therefore Mohs surgery was complete.     REPAIR: Transported to the ASC for oculoplastics repair.     Frankie Kline MD  Dermatology, PGY-5  Mohs surgery fellow    Scribe Disclosure:   Raymond LO, am serving as a scribe to document services personally performed by this physician, Dr. Dion Johns, based on data collection and the provider's statements to me.     Staff Physician Comments:   I saw and evaluated the patient with the Mohs  Surgery Fellow (Dr. Frankie Kline) and I agree with the assessment and plan and the above description of the procedure as documented by the scribe. I was present for the key portions of the procedure and entire exam.    I was immediately available in the clinic throughout the procedures.     Dion Johns DO    Department of Dermatology  [facility]: Phone: [phone], Fax:[phone]  [facility]: Phone: [phone], Fax: [phone]    Care and Laboratory Testing Performed at:  [facility]   Dermatology Clinic  [address] [place] [address]

## 2022-10-05 ENCOUNTER — OFFICE VISIT (OUTPATIENT)
Dept: OPHTHALMOLOGY | Facility: CLINIC | Age: 62
End: 2022-10-05
Payer: COMMERCIAL

## 2022-10-05 DIAGNOSIS — Z98.890 POSTOPERATIVE EYE STATE: ICD-10-CM

## 2022-10-05 DIAGNOSIS — C44.1122 BASAL CELL CARCINOMA (BCC) OF RIGHT LOWER EYELID: Primary | ICD-10-CM

## 2022-10-05 PROCEDURE — 99024 POSTOP FOLLOW-UP VISIT: CPT | Mod: GC | Performed by: OPHTHALMOLOGY

## 2022-10-05 ASSESSMENT — VISUAL ACUITY
OD_CC: 20/20
OD_CC+: -1
METHOD: SNELLEN - LINEAR
OS_CC+: -1
CORRECTION_TYPE: GLASSES
OS_CC: 20/20

## 2022-10-05 ASSESSMENT — EXTERNAL EXAM - RIGHT EYE: OD_EXAM: NORMAL

## 2022-10-05 ASSESSMENT — EXTERNAL EXAM - LEFT EYE: OS_EXAM: NORMAL

## 2022-10-05 ASSESSMENT — TONOMETRY
IOP_METHOD: ICARE
OS_IOP_MMHG: 13
OD_IOP_MMHG: 12

## 2022-10-05 NOTE — PROGRESS NOTES
Chief Complaint(s) and History of Present Illness(es)     Post Op (Ophthalmology) Right Eye     Laterality: right eye              Comments     S/p reconstruction of right lower eyelid with Tenzel rotational flap,   reconstruction of eyelid margin with full thickness eyelid margin defect   and lateral canthopexy 9/19/22. Used Maxitrol for 10 days PO, used   erythromycin for 10 days and will sometimes still use when the area feels   dry. Pt disappointed that there were not clearer instructions for the   erythromycin ointment and how long to use them. Happy with how things are   healing, some redness along lower lid. Feels hard along temporal corner.            Hx of basal cell carcinoma of right lower eyelid. Underwent Mohs microsurgery and closure with Reconstruction of right lower eyelid with Tenzel rotational flap, reconstruction of eyelid margin with full-thickness eyelid margin defect and lateral canthopexy on 9/19/22.    Her for post-op week #2 visit.    Interval history,      Assessment & Plan     Shante Huggins is a 62 year old female with the following diagnoses:   1. Basal cell carcinoma (BCC) of right lower eyelid    2. Postoperative eye state      -Doing well.   -No lagophthalmos  -Incision is clean, and well healed  -Minimal erythema and swelling    PLAN:  Patient Instructions   - Apply warm compresses for 1 minute two times a day until your bruising has resolved.  - Cool compresses can help with swelling and itching, you can alternate cool compresses with warm compresses if you find it helpful.  - Apply erythromycin ophthalmic ointment to your incision at bedtime until your tube is finished. Then you can apply Aquaphor or Vaseline to the incision at bedtime.   - The neomycin/polymixin/dexamethasone drop should be stopped at day 10. If you have symptoms of eye irritation, it is good to use over the counter artificial tears. Good brands include Refresh, Blink, and Systane. Do NOT get drops that are for  "\"red eyes.\"   - It is normal for the incision to appear raised, red, itch and have small lumps. You can gently massage any small bumps along the incision line. These can take up to six months to resolve.                 Dion Woods MD  PGY-2 Ophthalmology Resident    Attending Physician Attestation: Complete documentation of historical and exam elements from today's encounter can be found in the full encounter summary report (not reduplicated in this progress note). I personally obtained the chief complaint(s) and history of present illness. I confirmed and edited as necessary the review of systems, past medical/surgical history, family history, social history, and examination findings as documented by others; and I examined the patient myself. I personally reviewed the relevant tests, images, and reports as documented above. I formulated and edited as necessary the assessment and plan and discussed the findings and management plan with the patient.  -Shirley Kimball MD        "

## 2022-10-05 NOTE — PATIENT INSTRUCTIONS
"- Apply warm compresses for 1 minute two times a day until your bruising has resolved.  - Cool compresses can help with swelling and itching, you can alternate cool compresses with warm compresses if you find it helpful.  - Apply erythromycin ophthalmic ointment to your incision at bedtime until your tube is finished. Then you can apply Aquaphor or Vaseline to the incision at bedtime.   - The neomycin/polymixin/dexamethasone drop should be stopped at day 10. If you have symptoms of eye irritation, it is good to use over the counter artificial tears. Good brands include Refresh, Blink, and Systane. Do NOT get drops that are for \"red eyes.\"   - It is normal for the incision to appear raised, red, itch and have small lumps. You can gently massage any small bumps along the incision line. These can take up to six months to resolve.    "

## 2022-10-05 NOTE — NURSING NOTE
Chief Complaints and History of Present Illnesses   Patient presents with     Post Op (Ophthalmology) Right Eye       Chief Complaint(s) and History of Present Illness(es)     Post Op (Ophthalmology) Right Eye     Laterality: right eye              Comments     S/p reconstruction of right lower eyelid with Tenzel rotational flap, reconstruction of eyelid margin with full thickness eyelid margin defect and lateral canthopexy 9/19/22. Used Maxitrol for 10 days PO, used erythromycin for 10 days and will sometimes still use when the area feels dry. Pt disappointed that there were not clearer instructions for the erythromycin ointment and how long to use them. Happy with how things are healing, some redness along lower lid. Feels hard along temporal corner.                Khadijah Padron, COA

## 2025-02-18 NOTE — LETTER
9/19/2022         RE: Shante Huggins  [address] [place] [address]        Dear Colleague,    Thank you for referring your patient, Shante Huggins, to the [facility]. Please see a copy of my visit note below.    [facility] [place] Procedure Note     Dermatology Problem List:  1. BCC - R lower eyelid s/p bx Mohs and Oculoplastic repair 9/19/22   ____________________________________________      Date of Service:  Sep 19, 2022  Surgery: Mohs micrographic surgery    Case 1  Repair Type: other (comment) (Oculoplastics)  Repair Size: n/a  Suture Material: n/a  Tumor Type: BCC - Basal cell carcinoma  Location: right lower eyelid  Derm-Path Accession #: [id number]  PreOp Size: 0.9X0.3 cm  PostOp Size: 1.8x1.1 cm  Mohs Accession #: [id number]  Level of Defect: conjunctiva      Procedure:  We discussed the principles of treatment and most likely complications including scarring, bleeding, infection, swelling, pain, crusting, nerve damage, large wound,  incomplete excision, wound dehiscence,  nerve damage, recurrence, and a second procedure may be recommended to obtain the best cosmetic or functional result.    Informed consent was obtained and the patient underwent the procedure as follows:  The patient was placed supine on the operating table.  The cancer was identified, outlined with a marker, and verified by the patient.  The entire surgical field was prepped with Providone.  The surgical site was anesthetized using Lidocaine 1% with epi 1:100,000.      The area of clinically apparent tumor was debulked. The layer of tissue was then surgically excised using a #15 blade and was then transferred onto a specimen sheet maintaining the orientation of the specimen. Hemostasis was obtained using bipolar electrocoagulation. The wound site was then covered with a dressing while the tissue samples were processed for examination.    The excised tissue was  transported to the Mohs histology laboratory maintaining the tissue orientation.  The tissue specimen was relaxed so that the entire surgical margin was in a a single horizontal plane for sectioning and inked for precise mapping.  A precise reference map was drawn to reflect the sectioning of the specimen, colored inking of the margins, and orientation on the patient.  The tissue was processed using horizontal sectioning of the base and continuous peripheral margins.  The histopathologic sections were reviewed in conjunction with the reference map.    Total blocks: 1    Total slides:  3    Residual tumor was identified and indicated in red on the reference map, identifying the location where further tissue excision was necessary. Therefore, an additional stage of Mohs Micrographic surgery was deemed necessary.     Stage II   The patient was returned to the operating room, and the area prepped in the usual manner. The residual tumor was excised using the reference map as a guide. The specimen was transfered to a labeled specimen sheet maintaining the orientation of the specimen. Hemostasis was obtained and the wound site was covered with a dressing while the tissue was processed for examination.     The excised tissue was transported to the Mohs histology laboratory maintaining orientation. The specimen margins were inked for precise mapping and a reference map was prepared for the is additional stage to maintain precise orientation as described above. The tissue was processed using horizontal sectioning of the base and continuous peripheral margins. The histopathologic sections were reviewed in conjunction with the reference map.     Total blocks: 1    Total slides:  1    There were no cancer cells visualized on examination, therefore Mohs surgery was complete.     REPAIR: Transported to the [facility] for oculoplastics repair.     Frankie Kline MD  Dermatology, PGY-5  Mohs surgery fellow    Marco Antonio Disclosure:   Raymond LO  Collin Trinh, am serving as a scribe to document services personally performed by this physician, Dr. Dion Johns, based on data collection and the provider's statements to me.     Staff Physician Comments:   I saw and evaluated the patient with the Mohs Surgery Fellow (Dr. Frankie Kline) and I agree with the assessment and plan and the above description of the procedure as documented by the scribe. I was present for the key portions of the procedure and entire exam.    I was immediately available in the clinic throughout the procedures.     Dion Johns DO    Department of Dermatology  [facility]: Phone: [phone], Fax:[phone]  [facility]: Phone: [phone], Fax: [phone]    Care and Laboratory Testing Performed at:  [facility]   Dermatology Clinic  [address] [place] [address]        Again, thank you for allowing me to participate in the care of your patient.        Sincerely,        Dion Johns MD     Product 49 Units: 0 Send Charges To Patient Encounter: Yes Product 14 Price (In Dollars - Numeric Only, No Special Characters Or $): 0.00 Assigning Risk Information: Per AMA, level of risk is based upon consequences of the problem(s) addressed at the encounter when appropriately treated. Risk also includes medical decision making related to the need to initiate or forego further testing, treatment and/or hospitalization. Over the counter medication are assigned a risk level of low. Prescription medication management is assigned a risk level of moderate. Product 5 Application Directions: Apply morning and night to the face, neck and decolletage. For day time use, follow with Elta MD Sunscreen. Product 3 Application Directions: Apply liberally 15 minutes before sun exposure. Reapply after 80 minutes of swimming or sweating, and at least every 2 hours. Detail Level: Zone Name Of Product 4: Elta MD - Lip Balm Name Of Product 2: Elta MD- UV Sport Risk Of Complication Category: No MDM Name Of Product 3: Elta MD - UV Pure Product 4 Units: 1 Name Of Product 1: Elta MD - UV Restore Name Of Product 5: Elta MD - Barrier Renewal Complex

## (undated) DEVICE — SYR 03ML LL W/O NDL

## (undated) DEVICE — MARKER SKIN DOUBLE TIP W/FLEXI-RULER W/LABELS

## (undated) DEVICE — SOL WATER IRRIG 1000ML BOTTLE 07139-09

## (undated) DEVICE — NDL 30GA 0.5" 305106

## (undated) DEVICE — ESU ELEC NDL 1" COATED/INSULATED E1465

## (undated) DEVICE — PACK MINOR EYE

## (undated) DEVICE — GLOVE PROTEXIS W/NEU-THERA 7.5  2D73TE75

## (undated) DEVICE — ESU EYE HIGH TEMP 65410-183

## (undated) RX ORDER — FENTANYL CITRATE 50 UG/ML
INJECTION, SOLUTION INTRAMUSCULAR; INTRAVENOUS
Status: DISPENSED
Start: 2022-09-19

## (undated) RX ORDER — ACETAMINOPHEN 325 MG/1
TABLET ORAL
Status: DISPENSED
Start: 2022-09-19

## (undated) RX ORDER — LIDOCAINE HYDROCHLORIDE 20 MG/ML
INJECTION, SOLUTION EPIDURAL; INFILTRATION; INTRACAUDAL; PERINEURAL
Status: DISPENSED
Start: 2022-09-19

## (undated) RX ORDER — PROPOFOL 10 MG/ML
INJECTION, EMULSION INTRAVENOUS
Status: DISPENSED
Start: 2022-09-19